# Patient Record
Sex: MALE | Race: OTHER | URBAN - METROPOLITAN AREA
[De-identification: names, ages, dates, MRNs, and addresses within clinical notes are randomized per-mention and may not be internally consistent; named-entity substitution may affect disease eponyms.]

---

## 2023-12-15 PROCEDURE — 87661 TRICHOMONAS VAGINALIS AMPLIF: CPT

## 2023-12-15 PROCEDURE — 87800 DETECT AGNT MULT DNA DIREC: CPT

## 2023-12-16 ENCOUNTER — LAB REQUISITION (OUTPATIENT)
Dept: LAB | Facility: HOSPITAL | Age: 28
End: 2023-12-16
Payer: COMMERCIAL

## 2023-12-16 DIAGNOSIS — Z20.2 CONTACT WITH AND (SUSPECTED) EXPOSURE TO INFECTIONS WITH A PREDOMINANTLY SEXUAL MODE OF TRANSMISSION: ICD-10-CM

## 2023-12-16 LAB — T VAGINALIS RRNA SPEC QL NAA+PROBE: NEGATIVE

## 2023-12-17 LAB
C TRACH RRNA SPEC QL NAA+PROBE: NEGATIVE
N GONORRHOEA DNA SPEC QL PROBE+SIG AMP: POSITIVE

## 2025-01-06 ENCOUNTER — HOSPITAL ENCOUNTER (EMERGENCY)
Facility: HOSPITAL | Age: 30
Discharge: HOME | End: 2025-01-06
Attending: EMERGENCY MEDICINE
Payer: COMMERCIAL

## 2025-01-06 VITALS
TEMPERATURE: 98.3 F | HEIGHT: 73 IN | DIASTOLIC BLOOD PRESSURE: 78 MMHG | WEIGHT: 230 LBS | BODY MASS INDEX: 30.48 KG/M2 | SYSTOLIC BLOOD PRESSURE: 110 MMHG | RESPIRATION RATE: 18 BRPM | HEART RATE: 74 BPM | OXYGEN SATURATION: 98 %

## 2025-01-06 DIAGNOSIS — E86.0 DEHYDRATION: ICD-10-CM

## 2025-01-06 DIAGNOSIS — R19.7 DIARRHEA, UNSPECIFIED TYPE: Primary | ICD-10-CM

## 2025-01-06 LAB
BASOPHILS # BLD AUTO: 0.01 X10*3/UL (ref 0–0.1)
BASOPHILS NFR BLD AUTO: 0.2 %
EOSINOPHIL # BLD AUTO: 0 X10*3/UL (ref 0–0.7)
EOSINOPHIL NFR BLD AUTO: 0 %
ERYTHROCYTE [DISTWIDTH] IN BLOOD BY AUTOMATED COUNT: 12.1 % (ref 11.5–14.5)
FLUAV RNA RESP QL NAA+PROBE: NOT DETECTED
FLUBV RNA RESP QL NAA+PROBE: NOT DETECTED
HCT VFR BLD AUTO: 49.8 % (ref 41–52)
HGB BLD-MCNC: 17.7 G/DL (ref 13.5–17.5)
IMM GRANULOCYTES # BLD AUTO: 0.01 X10*3/UL (ref 0–0.7)
IMM GRANULOCYTES NFR BLD AUTO: 0.2 % (ref 0–0.9)
LYMPHOCYTES # BLD AUTO: 1.47 X10*3/UL (ref 1.2–4.8)
LYMPHOCYTES NFR BLD AUTO: 32 %
MCH RBC QN AUTO: 29.7 PG (ref 26–34)
MCHC RBC AUTO-ENTMCNC: 35.5 G/DL (ref 32–36)
MCV RBC AUTO: 84 FL (ref 80–100)
MONOCYTES # BLD AUTO: 0.34 X10*3/UL (ref 0.1–1)
MONOCYTES NFR BLD AUTO: 7.4 %
NEUTROPHILS # BLD AUTO: 2.76 X10*3/UL (ref 1.2–7.7)
NEUTROPHILS NFR BLD AUTO: 60.2 %
NRBC BLD-RTO: 0 /100 WBCS (ref 0–0)
PLATELET # BLD AUTO: 160 X10*3/UL (ref 150–450)
RBC # BLD AUTO: 5.95 X10*6/UL (ref 4.5–5.9)
SARS-COV-2 RNA RESP QL NAA+PROBE: NOT DETECTED
WBC # BLD AUTO: 4.6 X10*3/UL (ref 4.4–11.3)

## 2025-01-06 PROCEDURE — 85025 COMPLETE CBC W/AUTO DIFF WBC: CPT | Performed by: EMERGENCY MEDICINE

## 2025-01-06 PROCEDURE — 96374 THER/PROPH/DIAG INJ IV PUSH: CPT

## 2025-01-06 PROCEDURE — 99284 EMERGENCY DEPT VISIT MOD MDM: CPT | Mod: 25 | Performed by: EMERGENCY MEDICINE

## 2025-01-06 PROCEDURE — 87636 SARSCOV2 & INF A&B AMP PRB: CPT | Performed by: EMERGENCY MEDICINE

## 2025-01-06 PROCEDURE — 82565 ASSAY OF CREATININE: CPT | Performed by: EMERGENCY MEDICINE

## 2025-01-06 PROCEDURE — 96361 HYDRATE IV INFUSION ADD-ON: CPT

## 2025-01-06 PROCEDURE — 80053 COMPREHEN METABOLIC PANEL: CPT | Performed by: EMERGENCY MEDICINE

## 2025-01-06 PROCEDURE — 36415 COLL VENOUS BLD VENIPUNCTURE: CPT | Performed by: EMERGENCY MEDICINE

## 2025-01-06 PROCEDURE — 2500000004 HC RX 250 GENERAL PHARMACY W/ HCPCS (ALT 636 FOR OP/ED): Performed by: EMERGENCY MEDICINE

## 2025-01-06 RX ORDER — ONDANSETRON HYDROCHLORIDE 2 MG/ML
4 INJECTION, SOLUTION INTRAVENOUS ONCE
Status: COMPLETED | OUTPATIENT
Start: 2025-01-06 | End: 2025-01-06

## 2025-01-06 RX ORDER — DICYCLOMINE HYDROCHLORIDE 20 MG/1
20 TABLET ORAL 4 TIMES DAILY PRN
Qty: 20 TABLET | Refills: 0 | Status: SHIPPED | OUTPATIENT
Start: 2025-01-06 | End: 2025-01-16

## 2025-01-06 RX ORDER — ONDANSETRON 4 MG/1
4 TABLET, ORALLY DISINTEGRATING ORAL EVERY 8 HOURS PRN
Qty: 20 TABLET | Refills: 0 | Status: SHIPPED | OUTPATIENT
Start: 2025-01-06 | End: 2025-01-13

## 2025-01-06 RX ADMIN — SODIUM CHLORIDE 1000 ML: 9 INJECTION, SOLUTION INTRAVENOUS at 15:07

## 2025-01-06 RX ADMIN — ONDANSETRON 4 MG: 2 INJECTION INTRAMUSCULAR; INTRAVENOUS at 15:07

## 2025-01-06 ASSESSMENT — COLUMBIA-SUICIDE SEVERITY RATING SCALE - C-SSRS
1. IN THE PAST MONTH, HAVE YOU WISHED YOU WERE DEAD OR WISHED YOU COULD GO TO SLEEP AND NOT WAKE UP?: NO
2. HAVE YOU ACTUALLY HAD ANY THOUGHTS OF KILLING YOURSELF?: NO
6. HAVE YOU EVER DONE ANYTHING, STARTED TO DO ANYTHING, OR PREPARED TO DO ANYTHING TO END YOUR LIFE?: NO

## 2025-01-06 ASSESSMENT — PAIN - FUNCTIONAL ASSESSMENT: PAIN_FUNCTIONAL_ASSESSMENT: 0-10

## 2025-01-06 ASSESSMENT — PAIN SCALES - GENERAL: PAINLEVEL_OUTOF10: 0 - NO PAIN

## 2025-01-06 NOTE — ED PROVIDER NOTES
Emergency Medicine Transition of Care Note.    I received Yoan Barrientos in signout from Dr. Amador.  Please see the previous ED provider note for all HPI, PE and MDM up to the time of signout at 15:00. This is in addition to the primary record.    In brief Yoan Barrientos is an 29 y.o. male presenting for   Chief Complaint   Patient presents with    Dizziness    Diarrhea     Patient states he has been having dizziness, headaches, weakness, no appetite and diarrhea for about one week.  No cold symptoms, no vomiting     At the time of signout we were awaiting: Labs.    ED Course as of 01/06/25 1610 Mon Jan 06, 2025   1609 29-year-old male presented with 5 days of diarrhea dizziness.  He feels lightheaded.  He is having 5 daily episodes of diarrhea.  Loose watery stools.  Mild renal insufficiency with creatinine of 1.43 for which she was given a saline bolus.  Hemoconcentrated with 17.7 hemoglobin likely secondary to dehydration.  COVID flu negative.    Abdomen soft, nontender, benign.  Doubt acute surgical intra-abdominal process.    Tolerating oral intake.  Most likely self-limited diarrheal illness.  Encourage oral fluids.  Zofran and Bentyl as needed.  Referred to PCP for follow-up.  He should return with intractable diarrhea or any other concerns.  He agrees with this plan and verbalized understanding.  Discharged home. [BT]      ED Course User Index  [BT] Sundeep Rodriguez DO         Diagnoses as of 01/06/25 1610   Diarrhea, unspecified type   Dehydration       Medical Decision Making      Final diagnoses:   [R19.7] Diarrhea, unspecified type   [E86.0] Dehydration           Procedure  Procedures    DO Sundeep Canales DO  01/06/25 1610

## 2025-01-06 NOTE — Clinical Note
Yoan Barrientos was seen and treated in our emergency department on 1/6/2025.  He may return to work on 01/08/2025.       If you have any questions or concerns, please don't hesitate to call.      Sundeep Rodriguez, DO

## 2025-01-06 NOTE — ED PROVIDER NOTES
Dizziness and Diarrhea (Patient states he has been having dizziness, headaches, weakness, no appetite and diarrhea for about one week.  No cold symptoms, no vomiting)      HPI  Patient with diarrhea for the past 5 or 6 days.  He said he has had so much she has had difficulty with the exact amount.  He said he has some cramping but then when he has a bowel movement symptoms improved but overall no significant abdominal pain no fever no vomiting but has had some nausea.  No blood in stool he has no other complaints.    Physical Exam  Gen.: Vitals noted, no distress. Afebrile   Head: Normocephalic  ENT: Pupils equal, patent nares.  Normal oral slightly dry.  Neck: Supple.   Cardiac: Regular rate rhythm   Lungs: Clear to auscultation bilaterally with good aeration  Abdomen: Soft, nontender, nonsurgical.   Back: No midline or paraspinal tenderness.    Extremities:  Moves all extremities.  Skin: No rash.   Neuro: No focal neurologic deficits.    History reviewed. No pertinent past medical history.  Labs Reviewed - No data to display          Medical Decision Making most likely this is an infectious diarrhea we will obtain some basic labs give IV fluids and some Zofran for nausea most likely patient will be handed off to oncoming doctor.    Diagnosis - diarrhea      Julio César العراقي MD  01/06/25 [id number]

## 2025-01-07 LAB
ALBUMIN SERPL BCP-MCNC: 4.4 G/DL (ref 3.4–5)
ALP SERPL-CCNC: 73 U/L (ref 33–120)
ALT SERPL W P-5'-P-CCNC: 42 U/L (ref 10–52)
ANION GAP SERPL CALC-SCNC: 11 MMOL/L (ref 10–20)
AST SERPL W P-5'-P-CCNC: 62 U/L (ref 9–39)
BILIRUB SERPL-MCNC: 0.9 MG/DL (ref 0–1.2)
BUN SERPL-MCNC: 22 MG/DL (ref 6–23)
CALCIUM SERPL-MCNC: 9.1 MG/DL (ref 8.6–10.3)
CHLORIDE SERPL-SCNC: 97 MMOL/L (ref 98–107)
CO2 SERPL-SCNC: 29 MMOL/L (ref 21–32)
CREAT SERPL-MCNC: 1.43 MG/DL (ref 0.5–1.3)
EGFRCR SERPLBLD CKD-EPI 2021: 69 ML/MIN/1.73M*2
GLUCOSE SERPL-MCNC: 93 MG/DL (ref 74–99)
POTASSIUM SERPL-SCNC: 3.9 MMOL/L (ref 3.5–5.3)
PROT SERPL-MCNC: 7.5 G/DL (ref 6.4–8.2)
SODIUM SERPL-SCNC: 133 MMOL/L (ref 136–145)
